# Patient Record
Sex: MALE | Race: BLACK OR AFRICAN AMERICAN
[De-identification: names, ages, dates, MRNs, and addresses within clinical notes are randomized per-mention and may not be internally consistent; named-entity substitution may affect disease eponyms.]

---

## 2018-05-02 ENCOUNTER — HOSPITAL ENCOUNTER (EMERGENCY)
Dept: HOSPITAL 72 - EMR | Age: 48
Discharge: HOME | End: 2018-05-02
Payer: SELF-PAY

## 2018-05-02 VITALS — DIASTOLIC BLOOD PRESSURE: 78 MMHG | SYSTOLIC BLOOD PRESSURE: 134 MMHG

## 2018-05-02 VITALS — HEIGHT: 70 IN | WEIGHT: 185 LBS | BODY MASS INDEX: 26.48 KG/M2

## 2018-05-02 DIAGNOSIS — R11.2: ICD-10-CM

## 2018-05-02 DIAGNOSIS — R19.7: ICD-10-CM

## 2018-05-02 DIAGNOSIS — R10.9: Primary | ICD-10-CM

## 2018-05-02 PROCEDURE — 99284 EMERGENCY DEPT VISIT MOD MDM: CPT

## 2018-05-02 NOTE — EMERGENCY ROOM REPORT
History of Present Illness


General


Chief Complaint:  Abdominal Pain


Source:  Patient





Present Illness


HPI


48-year-old male presents to the emergency department complaining of cyst and 

diarrhea times a week and half.  Patient reports initially he had nausea 

vomiting for several days and then will not subsided he began having diarrhea.  

Patient denies recent antibiotic use he denies recent travel or ill contacts 

with similar symptoms.  He reports crampy abdominal pain with bloating 

intermittently usually preceding bowel movements.  He denies blood in the stool

, mucus in the stool, melena.  Denies fevers or chills.  He denies abdominal 

tenderness no significant past medical history patient has not taken any 

medications for her symptoms.  He states he is able to tolerate oral intake. 

Patient reports increase in flatulence. Denies chest pain, shortness of breath 

or additional symptoms.


Allergies:  


Coded Allergies:  


     No Known Allergies (Unverified , 5/2/18)





Patient History


Past Medical History:  see triage record


Past Surgical History:  none


Pertinent Family History:  none


Reviewed Nursing Documentation:  PMH: Agreed; PSxH: Agreed





Nursing Documentation-PMH


Past Medical History:  No Stated History





Review of Systems


All Other Systems:  negative except mentioned in HPI





Physical Exam





Vital Signs








  Date Time  Temp Pulse Resp B/P (MAP) Pulse Ox O2 Delivery O2 Flow Rate FiO2


 


5/2/18 18:42 98.7 94 20 145/83 96 Room Air  





 98.8       








Sp02 EP Interpretation:  reviewed, normal


General Appearance:  no apparent distress, alert, GCS 15, non-toxic


Head:  normocephalic, atraumatic


ENT:  hearing grossly normal, normal voice, moist mucus membranes


Neck:  full range of motion


Respiratory:  lungs clear, normal breath sounds, speaking full sentences


Cardiovascular #1:  regular rate, rhythm


Gastrointestinal:  normal bowel sounds, non tender, soft, non-distended, no 

guarding


Musculoskeletal:  back normal, gait/station normal, normal range of motion


Neurologic:  alert, oriented x3, responsive, motor strength/tone normal, 

sensory intact, speech normal, grossly normal


Psychiatric:  judgement/insight normal


Skin:  normal color, no rash, warm/dry, well hydrated





Medical Decision Making


PA Attestation


Dr. Matamoros is my supervising Physician whom patient management has been 

discussed with.


Diagnostic Impression:  


 Primary Impression:  


 Abdominal pain, vomiting, and diarrhea


ER Course


48-year-old male presents to the emergency department complaining of cyst and 

diarrhea times a week and half.  Patient reports initially he had nausea 

vomiting for several days and then will not subsided he began having diarrhea.  

Patient denies recent antibiotic use he denies recent travel or ill contacts 

with similar symptoms.  He reports crampy abdominal pain with bloating 

intermittently usually preceding bowel movements.  He denies blood in the stool

, mucus in the stool, melena.  Denies fevers or chills.  He denies abdominal 

tenderness no significant past medical history patient has not taken any 

medications for her symptoms.  He states he is able to tolerate oral intake. 

Patient reports increase in flatulence. Denies chest pain, shortness of breath 

or additional symptoms.





Ddx considered but are not limited to GE, colitis, acute appy, SBO just to name 

a few. 





Vital signs:  pt. is afebrile,   


H&PE are most consistent with  GE most likely viral in etiology, no evidence to 

suggest acute abdomen on physical exam. no evidence of dehydration. 





ORDERS: 





-None required at this time, the dx is clinical. 








ED INTERVENTIONS: 


-Bentyl PO


-Mylanta PO





-I do not identify an emergent condition at this time. With current presentation

,  pt. is stable for close outpatient follow up and conservative treatment.  D/

w pt. to return promptly to ED with worsening or new symptoms.- Pt. (and or 

responsible party) verbalizes' understanding and agreement with proposed 

treatment plan.proposed treatment plan. 





DISCHARGE: At this time pt. is stable for d/c to home. Will provide printed 

patient care instructions, and any necessary prescriptions. Care plan and 

follow up instructions have been discussed with the patient prior to discharge.





Last Vital Signs








  Date Time  Temp Pulse Resp B/P (MAP) Pulse Ox O2 Delivery O2 Flow Rate FiO2


 


5/2/18 18:42 98.7 94 20 145/83 96 Room Air  





 98.8       








Disposition:  HOME, SELF-CARE


Condition:  Stable


Scripts


Mag Hydrox/Al Hydrox/Simeth (ALUM-MAG HYDROXIDE-SIMETH LIQ) 360 Ml Oral.susp


10 ML PO BID, #360 ML


   Prov: Ashleigh Coronado         5/2/18 


Dicyclomine Hcl* (DICYCLOMINE HCL*) 10 Mg Capsule


10 MG PO QID for 3 Days, #12 CAP


   Prov: Ashleigh Coronado         5/2/18


Departure Forms:  Return to Work      Return to Work Date:  May 6, 2018


   Work Restrictions:  None


   Other Restrictions:  may return sooner if symptoms have resolved. 


   Return to Full Activity:  May 6, 2018


Patient Instructions:  Viral Gastroenteritis, Adult





Additional Instructions:  


Take medications as directed. 


Drink plenty of water and stay hydrated.





 ** Follow up with a Primary Care Provider in 3-5 days, even if your symptoms 

have resolved. ** 


--Please review list of primary care clinics, if you do not already have a 

primary care provider





Return sooner to ED if new symptoms occur, or current symptoms become worse. 











- Please note that this Emergency Department Report was dictated using Accountable technology software, occasionally this can lead to 

erroneous entry secondary to interpretation by the dictation equipment.











Ashleigh Coronado May 2, 2018 19:25